# Patient Record
Sex: FEMALE | Race: ASIAN | URBAN - METROPOLITAN AREA
[De-identification: names, ages, dates, MRNs, and addresses within clinical notes are randomized per-mention and may not be internally consistent; named-entity substitution may affect disease eponyms.]

---

## 2024-01-05 ENCOUNTER — TELEPHONE (OUTPATIENT)
Dept: OBGYN CLINIC | Age: 34
End: 2024-01-05

## 2024-01-05 NOTE — TELEPHONE ENCOUNTER
Michelle Perry calling reporting a positive pregnancy test.    Michelle Perry is  a new patient.   If no: previous OBGYN no obgyn  This is  Michelle Perry's first pregnancy.        Michelle Perry last menstrual cycle: 11/18/23  Based on LMP patient is 6w6d weeks     Dose patient have any concerns?   []YES  [x]NO  If yes, please discuss concern with provider to determine if patient needs additional appointment.      Scheduling Instructions and Education  [x]If patient less than 8 weeks please schedule missed menses (30 mins) between 6-8 weeks. ALSO scheduled USN w/ IPV (w/ NURSE) to follow 2-3 weeks after missed menses   Patient education: Initial missed menses appointment will consist of a pregnancy test and to establish care and review previous births **if applicable**. There will NOT be an ultrasound at this first visit. Please review that the USN and IPV visit will be 2-3 weeks after patient's missed menses. At this appointment dating ultrasound will be complete, prenatal labs ordered, prenatal education completed, pelvic exam if indicated     Please document appointments scheduled during phone call   Missed menses date/provider: 1/16/24  USN/IPV date:1/24 1:45 usn, 1/24 2:15 ipv    Please forward telephone encounter to provider appointments are scheduled with prior to closing telephone encounter.

## 2024-01-10 ENCOUNTER — TELEPHONE (OUTPATIENT)
Dept: OBGYN CLINIC | Age: 34
End: 2024-01-10

## 2024-01-10 NOTE — TELEPHONE ENCOUNTER
Keon called earlier in regards to trying to get a letter stating that due to pregnancy the patient, his wife, was unable to get the MMR vaccine. They need this statement in order to get the process moving with getting the patient's green card. After speaking with Juliana I attempted to make contact with Keon. He did not answer the phone so I did leave a detailed message- let him know that unfortunately due to the fact that his wife is not an established patient here we are unable to give any documentation because she is not an established patient yet at our office. I did inform him that it was not recommended to get the MMR during pregnancy due to it being a live virus. Told him that if he had any questions to reach out/

## 2024-01-16 ENCOUNTER — HOSPITAL ENCOUNTER (OUTPATIENT)
Age: 34
Setting detail: SPECIMEN
Discharge: HOME | End: 2024-01-16

## 2024-01-16 ENCOUNTER — OFFICE VISIT (OUTPATIENT)
Dept: OBGYN CLINIC | Age: 34
End: 2024-01-16
Payer: COMMERCIAL

## 2024-01-16 VITALS
DIASTOLIC BLOOD PRESSURE: 70 MMHG | WEIGHT: 146 LBS | HEIGHT: 70 IN | SYSTOLIC BLOOD PRESSURE: 120 MMHG | BODY MASS INDEX: 20.9 KG/M2

## 2024-01-16 DIAGNOSIS — N91.2 AMENORRHEA: Primary | ICD-10-CM

## 2024-01-16 DIAGNOSIS — N92.6 MISSED MENSES: ICD-10-CM

## 2024-01-16 PROCEDURE — 99203 OFFICE O/P NEW LOW 30 MIN: CPT | Performed by: ADVANCED PRACTICE MIDWIFE

## 2024-01-16 PROCEDURE — 81025 URINE PREGNANCY TEST: CPT | Performed by: ADVANCED PRACTICE MIDWIFE

## 2024-01-16 RX ORDER — FOLIC ACID 1 MG/1
1 TABLET ORAL DAILY
COMMUNITY

## 2024-01-16 ASSESSMENT — ANXIETY QUESTIONNAIRES
GAD7 TOTAL SCORE: 3
2. NOT BEING ABLE TO STOP OR CONTROL WORRYING: 0
IF YOU CHECKED OFF ANY PROBLEMS ON THIS QUESTIONNAIRE, HOW DIFFICULT HAVE THESE PROBLEMS MADE IT FOR YOU TO DO YOUR WORK, TAKE CARE OF THINGS AT HOME, OR GET ALONG WITH OTHER PEOPLE: NOT DIFFICULT AT ALL
4. TROUBLE RELAXING: 0
6. BECOMING EASILY ANNOYED OR IRRITABLE: 1
7. FEELING AFRAID AS IF SOMETHING AWFUL MIGHT HAPPEN: 0
5. BEING SO RESTLESS THAT IT IS HARD TO SIT STILL: 0
1. FEELING NERVOUS, ANXIOUS, OR ON EDGE: 1
3. WORRYING TOO MUCH ABOUT DIFFERENT THINGS: 1

## 2024-01-16 NOTE — PROGRESS NOTES
[facility] [address]  [facility] [address]  Dept: [phone]    Patient Name: Michelle Perry  Patient Age: 33 y.o.  Date of Visit: 2024    SUBJECTIVE:    Chief Complaint:  Chief Complaint   Patient presents with    Amenorrhea       HPI:    Michelle  is being seen today for missed menses.  This  is a planned pregnancy.  She is  accepting at this time.  LMP: Patient's last menstrual period was 2023 (exact date). Sure and definite: Yes    28 day cycle: Yes    She was on a contraceptive at the time of conception.  Estimated weeks gestation today : 8w3d  Tentative JENNIFER: 24    Relationship with FOB: Keon    Patient reports concerns: no    OB History          1    Para        Term                AB        Living             SAB        IAB        Ectopic        Molar        Multiple        Live Births                  History reviewed. No pertinent past medical history.  Current Outpatient Medications   Medication Sig Dispense Refill    Prenatal Vit-DSS-Fe Cbn-FA (PRENATAL AD PO) Take by mouth      folic acid (FOLVITE) 1 MG tablet Take 1 tablet by mouth daily       No current facility-administered medications for this visit.         OBJECTIVE:    /70 (Site: Right Upper Arm, Position: Sitting, Cuff Size: Small Adult)   Ht 1.778 m (5' 10\")   Wt 66.2 kg (146 lb)   LMP 2023 (Exact Date)   BMI 20.95 kg/m²       She is complaining today of:   Pain: No  Cramping: No  Bleeding or spotting: No    Nausea: Yes  Vomiting: No    Breast enlargement/tenderness: No  Fatigue: Yes  Frequency of urination: No    Previous high risk obstetrical history: n/a  OB History    Para Term  AB Living   1             SAB IAB Ectopic Molar Multiple Live Births                    # Outcome Date GA Lbr Farzad/2nd Weight Sex Delivery Anes PTL Lv   1 Current                History was reviewed as documented on [facility]

## 2024-01-16 NOTE — PROGRESS NOTES
[facility] [address]  [facility] [address]  Dept: [phone]    Missed Menses Intake    Subjective:    Patient Name:Michelle Perry  Patient Age: 33 y.o.  Date of Visit: 2024    Michelle Perry arrives for missed menses visit.   Michelle Perry had a positive pregnancy test   Michelle Perry does not have concerns.   Planned Pregnancy: Yes  Partner/FOB name: Keon   Patient's last menstrual period was 2023 (exact date)., Regular menses: Yes  Estimate gestation age of LMP: 8w3d  Estimated due date based on LMP: 24  Patient Occupation: pt states she is working from home    OBGYN History:   Date of Last Pap Smear: unsure does not believe she has had this done    Number of Pregnancies: 1  Number of Live Births: 0  [] Vaginal Birth  []  Birth  [] Vaginal Birth after  delivery ()  [] History of High Risk Pregnancy(ies)  [] History of Birth Complications  [] Hx of infant with NICU stay    Past Medical History  Diabetes: No  Anxiety: No  Depression: No  Bipolar: No  High Blood Pressure:No  Stroke: No  Seizure: No  Deep Vein Thrombosis: No  Preeclampsia: No  Gestational Diabetes:No  Gestational Hypertension:No  Postpartum Hemorrhage: No  Bleeding Disorder: No  Sexually Transmitted Infections: No  Genital Herpes: No  History of chicken pox/varicella vaccine: No, unsure   Daily Medications: Yes  Pnv and folic acid    Past Family History (first degree relative)  Family history of blood clots: No  Family history of diabetes:No  Family history of factor V: No  Family history (mother/sister) of preeclampsia in a previous pregnancy: No    Early 1 Hour Glucose Screening  [] BMI 30 or greater (if marked, positive screen)  Risk Factors (need more than 1 if BMI less than 30)  [] Physical inactivity   [] First degree relative with diabetes  [] High- risk race or ethnicity (, , ,  American,

## 2024-01-17 DIAGNOSIS — N92.6 MISSED MENSES: ICD-10-CM

## 2024-01-17 DIAGNOSIS — N91.2 AMENORRHEA: ICD-10-CM

## 2024-01-17 LAB
AMPHET UR QL SCN: NEGATIVE
BARBITURATES UR QL SCN: NEGATIVE
BENZODIAZ UR QL: NEGATIVE
CANNABINOIDS UR QL SCN: NEGATIVE
CHLAMYDIA DNA UR QL NAA+PROBE: NEGATIVE
COCAINE UR QL SCN: NEGATIVE
FENTANYL UR QL: NEGATIVE
METHADONE UR QL: NEGATIVE
N GONORRHOEA DNA UR QL NAA+PROBE: NEGATIVE
OPIATES UR QL SCN: NEGATIVE
OXYCODONE UR QL SCN: NEGATIVE
PCP UR QL SCN: NEGATIVE
SPECIMEN DESCRIPTION: NORMAL
TEST INFORMATION: NORMAL

## 2024-01-23 NOTE — PROGRESS NOTES
DISCHARGE SUMMARY  Time: 30+ minutes of discharge activities.    Date of admission: 4/5/2018  Date of discharge: 4/8/2018    Principal Diagnosis:  Mood disorder not otherwise specified    Other Diagnosis/Problem List:   Borderline personality features  Anxiety disorder not otherwise specified  Cannabis use disorder  Active Hospital Problems    Diagnosis Date Noted   • Irregular menses        Reason for Admission :  Please see intake psychiatric evaluation for details.     Hospital Course:Liliya Corrigan was admitted and restricted to the unit. The key issues addressed during this stay were  suicidal ideation, anxiety disorder symptoms, out of control behavior, depression symptoms, substance use disorder symptoms, psychosocial stressors and mood symptoms. Patient was treated with scheduled psychotropic medications including lamotrigine, venlafaxine. Patient was also prescribed p.r.n. medications. Patient received milieu, individual and group psychotherapy.   Patient 's condition improved . Patient displayed no self-injurious behaviors. Patient displayed no physically aggressive behaviors.  Patient was  compliant with prescribed medication administration. Patient had no adverse treatment effects..Detox: no. Patient was  stable for discharge on 4/8/2018. Lab work was drawn with results shown below.     Condition of Patient on 4/8/2018): Writer reviewed chart, discussed case with team, performed discharge items including medication reconciliation, reviewed/arranged aftercare in consultation/coordination with other staff.     I interviewed Liliya Corrigan.  Patient's symptoms were substantially improved.     Reports feeling a lot better. Pleasant on contact today. Asking to leave today. States her aunt is sick admitted at [facility]. States she can stay with a friend. No suicidal thoughts anymore. We discussed follow-up. Renew counseling and    New Obstetric Intake     Patient Name:Michelle Perry  Patient Age: 33 y.o.  Date of Visit: 2024  Patient's estimated gestational age at visit: 9W4D  Estimated Date of Delivery: 24-TWINS    Subjective:    Any Concerns Today: yes - twins    OB History          1    Para        Term                AB        Living             SAB        IAB        Ectopic        Molar        Multiple        Live Births                    Lawtons Score:   Postpartum Depression: Not on file      History of postpartum depression: N/A    Generalized Anxiety Screenin/24/2024     2:52 PM 2024     3:42 PM   NEVIN 7 SCORE   NEVIN-7 Total Score 8 3     Interpretation of NEVIN-7 score: 5-9 = mild anxiety, 10-14 = moderate anxiety, 15+ = severe anxiety. Recommend referral to behavioral health for scores 10 or greater.     Social Determinants of Health:   Financial Resource Strain: Low Risk  (2024)    Overall Financial Resource Strain (CARDIA)     Difficulty of Paying Living Expenses: Not hard at all      Food Insecurity: No Food Insecurity (2024)    Hunger Vital Sign     Worried About Running Out of Food in the Last Year: Never true     Ran Out of Food in the Last Year: Never true      Transportation Needs: No Transportation Needs (2024)    PRAPARE - Transportation     Lack of Transportation (Medical): No     Lack of Transportation (Non-Medical): No      Intimate Partner Violence: Not At Risk (2024)    Humiliation, Afraid, Rape, and Kick questionnaire     Fear of Current or Ex-Partner: No     Emotionally Abused: No     Physically Abused: No     Sexually Abused: No       Objective:      /62   Pulse 100   Wt 65.8 kg (145 lb)   LMP 2023 (Exact Date)      Medications:  Current Outpatient Medications   Medication Sig Dispense Refill    ferrous sulfate (FE TABS 325) 325 (65 Fe) MG EC tablet Take 1 tablet by mouth 3 times daily (with meals)      Prenatal Vit-DSS-Fe Cbn-FA (PRENATAL AD  mental health [facility] hospital program. We discussed her marijuana use and effect of it on her mental health. Agrees to stay sober.    We reviewed hospital course, safety and recovery plans, and aftercare treatment needs as well as medications, side effects and monitoring and risk issues. Patient was advised/encouraged to follow-up with all aftercare. Advised to refrain from use of alcohol and illicit drugs. Advised to seek help in psychiatric emergency.    VITALS:    Visit Vitals  /81 (Patient Position: Standing)   Pulse 85   Temp 98 °F (36.7 °C) (Oral)   Resp 16   Ht 4' 11\" (1.499 m)   Wt 75 kg   LMP 03/09/2018   SpO2 99%   BMI 33.40 kg/m²       MENTAL STATUS EXAM    General appearance: well-nourished  Grooming: appropriate  Attitude: Cooperative and Pleasant  Speech: normal rate, rhythm, volume and tone  Mood/affect: euthymic  Psychomotor: normal  Associations: intact  Thought process: intact  Thought content: unremarkable  Perceptual disorders/hallucinations: none  Level of consciousness: alert  Orientation: oriented to person, place, time, and general circumstances  Attention/Concentration: ability to maintain attention  Fund of knowledge/Intelligence: average  Memory: no apparent impairments in short term memory and no apparent impairments in long term memory   Insight: fair  Judgment: fair  Suicidal thoughts: no  Homicidal thoughts: no  Language:intact  Gait/Station:normal    Medical History and Physical examination:  Seen by consultant. No significant findings/abnormalities. Please see the admission H & P consultation note.        Discharge Medications:      Summary of your Discharge Medications      Take these Medications      Details   * albuterol 0.63 MG/3ML nebulizer solution  Commonly known as:  ACCUNEB   Take 0.63 mg by nebulization every 6 hours as needed for Wheezing or Shortness of Breath.     * albuterol 108 (90 Base) MCG/ACT inhaler   Inhale 2 puffs into the lungs every 4 hours as needed for  Shortness of Breath or Wheezing.     azelastine 0.1 % nasal spray  Commonly known as:  ASTELIN   Spray 1 spray in each nostril 2 times daily. Use in each nostril as directed     DISPENSE   Cortisone Injection Inject into the left foot once.     DULERA 100-5 MCG/ACT inhaler   Generic drug:  mometasone-formoterol  Inhale 1 puff into the lungs 2 times daily.     fluticasone 50 MCG/ACT nasal spray  Commonly known as:  FLONASE   Spray 2 sprays in each nostril daily.     hydrOXYzine 25 MG tablet  Commonly known as:  ATARAX   Take 1 tablet by mouth every 6 hours as needed for Anxiety.     lamoTRIgine 150 MG tablet  Commonly known as:  LaMICtal   Take 1 tablet by mouth daily.     montelukast 10 MG tablet  Commonly known as:  SINGULAIR   Take 10 mg by mouth nightly.     omeprazole 20 MG capsule  Commonly known as:  PRILOSEC   Take 20 mg by mouth daily.     traZODone 100 MG tablet  Commonly known as:  DESYREL   Take 2 tablets by mouth nightly.     Valacyclovir HCl 1000 MG Tab   Take 1,000 mg by mouth daily.     * venlafaxine  MG 24 hr capsule  Commonly known as:  EFFEXOR XR   Take 1 capsule by mouth daily.     * venlafaxine XR 75 MG 24 hr capsule  Commonly known as:  EFFEXOR XR   Take 1 capsule by mouth daily. Take in addition to 150mg capsule (total dose 225mg daily)  Comment:  Patient would like to  asap, thank you        * This list has 4 medication(s) that are the same as other medications prescribed for you. Read the directions carefully, and ask your doctor or other care provider to review them with you.                 Medication Indications      Medication      Indication   albuterol 0.63 MG/3ML nebulizer solution  Commonly known as:  ACCUNEB      albuterol 108 (90 Base) MCG/ACT inhaler      azelastine 0.1 % nasal spray  Commonly known as:  ASTELIN      DISPENSE      DULERA 100-5 MCG/ACT inhaler  Generic drug:  mometasone-formoterol      fluticasone 50 MCG/ACT nasal spray  Commonly known as:  FLONASE       hydrOXYzine 25 MG tablet  Commonly known as:  ATARAX  Take 1 tablet by mouth every 6 hours as needed for Anxiety.      lamoTRIgine 150 MG tablet  Commonly known as:  LaMICtal  Take 1 tablet by mouth daily.   bipolar disorder     montelukast 10 MG tablet  Commonly known as:  SINGULAIR      omeprazole 20 MG capsule  Commonly known as:  PRILOSEC      traZODone 100 MG tablet  Commonly known as:  DESYREL  Take 2 tablets by mouth nightly.      Valacyclovir HCl 1000 MG Tab      venlafaxine  MG 24 hr capsule  Commonly known as:  EFFEXOR XR  Take 1 capsule by mouth daily.      venlafaxine XR 75 MG 24 hr capsule  Commonly known as:  EFFEXOR XR  Take 1 capsule by mouth daily. Take in addition to 150mg capsule (total dose 225mg daily)   depression, anxiety, PTSD            Risk Status on discharge: Not an acute/imminent risk of harm to self or others.    Tobacco Best Practice  Nicotine replacement will not be ordered due to: Patient refuses nicotine gum and nicotine patch for tobacco cessation      If the patient is on two or more scheduled antipsychotic agents:  No     Legal:Voluntary      After Care :  follow up with out pt psychiatrist and therapist and [facility]  Please see after visit summary for details     Laboratory Tests:    CBC with differential  Lab Results   Component Value Date    WBC 14.0 (H) 03/10/2018    RBC 4.86 03/10/2018    HGB 14.2 03/10/2018    HCT 42.1 03/10/2018     03/10/2018     06/25/2011    SEG 54 03/10/2018    PMON 5 03/10/2018    PEOS 3 03/10/2018    PBASO 1 03/10/2018    ANEUT 7.6 03/10/2018    ALYMS 5.1 (H) 03/10/2018    JESSY 0.8 03/10/2018    AEOS 0.4 03/10/2018    ABASO 0.1 03/10/2018       Comprehensive metabolic panel  Lab Results   Component Value Date    SODIUM 140 03/10/2018    POTASSIUM 4.3 03/10/2018    CHLORIDE 106 03/10/2018    CO2 26 03/10/2018    GLUCOSE 111 (H) 03/10/2018    BUN 11 03/10/2018    CREATININE 0.55 03/10/2018    CALCIUM 9.5  03/10/2018    ALBUMIN 3.8 06/25/2011    BILIRUBIN 0.3 06/25/2011    ALKPT 81 06/25/2011    AST 14 06/25/2011    GPT 35 06/25/2011     No results found for: MG  TSH (mcUnits/mL)   Date Value   03/10/2018 1.506     No results found for this or any previous visit.    POC Breath Alcohol testing result:       Hepatitis serology result:  @[facility][02878.03]@    POC Urine Drug Screen Results  Cocaine:                   Negative (04/05/18 1230)  Opiates:                    Negative (04/05/18 1230)  Buprenorphine:          Negative (04/05/18 1230)  Amphetamines:         Negative (04/05/18 1230)  Propoxyphene:            Oxycodone/Oxycontin:   Negative (04/05/18 1230)  Methamphetamine:     Negative (04/05/18 1230)  Barbiturates:                Negative (04/05/18 1230)  Marijuana:              Positive (04/05/18 1230)  Benzodiazepine:      Negative (04/05/18 1230)  Methadone:                    Negative (04/05/18 1230)  Methylenedioxymethamphetamine:  Negative (04/05/18 1230)      No results found for: LITHIUM   No results found for: VALP  No results found for: CARBAM    No results found for: CPK  No results found for: HGBA1C    LIPID PANEL  No results found for: CHOLESTEROL No results found for: HDL No results found for: CHOHDL No results found for: TRIGLYCERIDE No results found for: CALCLDL  No results found for this or any previous visit.    Urine Panel  No results for input(s): UOSM, UK, 5UNITR, UCROA, UCL, NORMA, UKET, USPG, UPROT, UWBC, URBC, UBILI, UPH, UURO, USPG, UBACTR, UTPELC in the last 72 hours.    Invalid input(s): SPGRAVITY    Latest radiology results: No results found.    EKG- none

## 2024-01-24 ENCOUNTER — HOSPITAL ENCOUNTER (OUTPATIENT)
Age: 34
Setting detail: SPECIMEN
Discharge: HOME | End: 2024-01-24

## 2024-01-24 ENCOUNTER — INITIAL PRENATAL (OUTPATIENT)
Dept: OBGYN CLINIC | Age: 34
End: 2024-01-24

## 2024-01-24 VITALS
DIASTOLIC BLOOD PRESSURE: 62 MMHG | WEIGHT: 145 LBS | SYSTOLIC BLOOD PRESSURE: 102 MMHG | BODY MASS INDEX: 20.81 KG/M2 | HEART RATE: 100 BPM

## 2024-01-24 DIAGNOSIS — Z3A.09 9 WEEKS GESTATION OF PREGNANCY: Primary | ICD-10-CM

## 2024-01-24 DIAGNOSIS — Z3A.09 9 WEEKS GESTATION OF PREGNANCY: ICD-10-CM

## 2024-01-24 PROCEDURE — 0500F INITIAL PRENATAL CARE VISIT: CPT | Performed by: ADVANCED PRACTICE MIDWIFE

## 2024-01-24 RX ORDER — LANOLIN ALCOHOL/MO/W.PET/CERES
325 CREAM (GRAM) TOPICAL
COMMUNITY

## 2024-01-24 SDOH — ECONOMIC STABILITY: INCOME INSECURITY: IN THE LAST 12 MONTHS, WAS THERE A TIME WHEN YOU WERE NOT ABLE TO PAY THE MORTGAGE OR RENT ON TIME?: NO

## 2024-01-24 SDOH — ECONOMIC STABILITY: TRANSPORTATION INSECURITY
IN THE PAST 12 MONTHS, HAS THE LACK OF TRANSPORTATION KEPT YOU FROM MEDICAL APPOINTMENTS OR FROM GETTING MEDICATIONS?: NO

## 2024-01-24 SDOH — ECONOMIC STABILITY: HOUSING INSECURITY: IN THE LAST 12 MONTHS, HOW MANY PLACES HAVE YOU LIVED?: 2

## 2024-01-24 SDOH — ECONOMIC STABILITY: FOOD INSECURITY: WITHIN THE PAST 12 MONTHS, YOU WORRIED THAT YOUR FOOD WOULD RUN OUT BEFORE YOU GOT MONEY TO BUY MORE.: NEVER TRUE

## 2024-01-24 SDOH — ECONOMIC STABILITY: HOUSING INSECURITY
IN THE LAST 12 MONTHS, WAS THERE A TIME WHEN YOU DID NOT HAVE A STEADY PLACE TO SLEEP OR SLEPT IN A SHELTER (INCLUDING NOW)?: NO

## 2024-01-24 SDOH — ECONOMIC STABILITY: TRANSPORTATION INSECURITY
IN THE PAST 12 MONTHS, HAS LACK OF TRANSPORTATION KEPT YOU FROM MEETINGS, WORK, OR FROM GETTING THINGS NEEDED FOR DAILY LIVING?: NO

## 2024-01-24 SDOH — ECONOMIC STABILITY: FOOD INSECURITY: WITHIN THE PAST 12 MONTHS, THE FOOD YOU BOUGHT JUST DIDN'T LAST AND YOU DIDN'T HAVE MONEY TO GET MORE.: NEVER TRUE

## 2024-01-24 ASSESSMENT — ANXIETY QUESTIONNAIRES
6. BECOMING EASILY ANNOYED OR IRRITABLE: 2
2. NOT BEING ABLE TO STOP OR CONTROL WORRYING: 1
4. TROUBLE RELAXING: 1
7. FEELING AFRAID AS IF SOMETHING AWFUL MIGHT HAPPEN: 1
1. FEELING NERVOUS, ANXIOUS, OR ON EDGE: 1
IF YOU CHECKED OFF ANY PROBLEMS ON THIS QUESTIONNAIRE, HOW DIFFICULT HAVE THESE PROBLEMS MADE IT FOR YOU TO DO YOUR WORK, TAKE CARE OF THINGS AT HOME, OR GET ALONG WITH OTHER PEOPLE: SOMEWHAT DIFFICULT
5. BEING SO RESTLESS THAT IT IS HARD TO SIT STILL: 1
GAD7 TOTAL SCORE: 8
3. WORRYING TOO MUCH ABOUT DIFFERENT THINGS: 1

## 2024-01-24 ASSESSMENT — SOCIAL DETERMINANTS OF HEALTH (SDOH)
HOW HARD IS IT FOR YOU TO PAY FOR THE VERY BASICS LIKE FOOD, HOUSING, MEDICAL CARE, AND HEATING?: NOT HARD AT ALL
WITHIN THE LAST YEAR, HAVE TO BEEN RAPED OR FORCED TO HAVE ANY KIND OF SEXUAL ACTIVITY BY YOUR PARTNER OR EX-PARTNER?: NO
WITHIN THE LAST YEAR, HAVE YOU BEEN HUMILIATED OR EMOTIONALLY ABUSED IN OTHER WAYS BY YOUR PARTNER OR EX-PARTNER?: NO
WITHIN THE LAST YEAR, HAVE YOU BEEN AFRAID OF YOUR PARTNER OR EX-PARTNER?: NO
WITHIN THE LAST YEAR, HAVE YOU BEEN KICKED, HIT, SLAPPED, OR OTHERWISE PHYSICALLY HURT BY YOUR PARTNER OR EX-PARTNER?: NO

## 2024-01-25 LAB
MICROORGANISM SPEC CULT: NO GROWTH
SPECIMEN DESCRIPTION: NORMAL

## 2024-02-12 ENCOUNTER — HOSPITAL ENCOUNTER (OUTPATIENT)
Age: 34
Discharge: HOME | End: 2024-02-12
Payer: COMMERCIAL

## 2024-02-12 ENCOUNTER — ROUTINE PRENATAL (OUTPATIENT)
Dept: PERINATAL CARE | Age: 34
End: 2024-02-12
Payer: COMMERCIAL

## 2024-02-12 VITALS
BODY MASS INDEX: 20.9 KG/M2 | SYSTOLIC BLOOD PRESSURE: 121 MMHG | HEIGHT: 70 IN | HEART RATE: 100 BPM | WEIGHT: 146 LBS | RESPIRATION RATE: 16 BRPM | TEMPERATURE: 97.5 F | DIASTOLIC BLOOD PRESSURE: 76 MMHG

## 2024-02-12 DIAGNOSIS — Z3A.12 12 WEEKS GESTATION OF PREGNANCY: Primary | ICD-10-CM

## 2024-02-12 DIAGNOSIS — O34.10 LEIOMYOMA IN PREGNANCY, UTERINE, ANTEPARTUM: ICD-10-CM

## 2024-02-12 DIAGNOSIS — O09.511 PRIMIGRAVIDA OF ADVANCED MATERNAL AGE IN FIRST TRIMESTER: ICD-10-CM

## 2024-02-12 DIAGNOSIS — Z3A.12 12 WEEKS GESTATION OF PREGNANCY: ICD-10-CM

## 2024-02-12 DIAGNOSIS — O30.041 DICHORIONIC DIAMNIOTIC TWIN PREGNANCY IN FIRST TRIMESTER: ICD-10-CM

## 2024-02-12 DIAGNOSIS — Z36.9 FIRST TRIMESTER SCREENING: Primary | ICD-10-CM

## 2024-02-12 DIAGNOSIS — O28.3 INCREASED NUCHAL TRANSLUCENCY SPACE ON FETAL ULTRASOUND: ICD-10-CM

## 2024-02-12 DIAGNOSIS — D25.9 LEIOMYOMA IN PREGNANCY, UTERINE, ANTEPARTUM: ICD-10-CM

## 2024-02-12 DIAGNOSIS — O35.9XX2 FETAL ABNORMALITY AFFECTING MANAGEMENT OF MOTHER, FETUS 2 OF MULTIPLE GESTATION: ICD-10-CM

## 2024-02-12 PROCEDURE — 76802 OB US < 14 WKS ADDL FETUS: CPT | Performed by: OBSTETRICS & GYNECOLOGY

## 2024-02-12 PROCEDURE — 76813 OB US NUCHAL MEAS 1 GEST: CPT | Performed by: OBSTETRICS & GYNECOLOGY

## 2024-02-12 PROCEDURE — 76801 OB US < 14 WKS SINGLE FETUS: CPT | Performed by: OBSTETRICS & GYNECOLOGY

## 2024-02-12 PROCEDURE — 99999 PR OFFICE/OUTPT VISIT,PROCEDURE ONLY: CPT | Performed by: OBSTETRICS & GYNECOLOGY

## 2024-02-12 PROCEDURE — 36415 COLL VENOUS BLD VENIPUNCTURE: CPT

## 2024-02-12 PROCEDURE — 76814 OB US NUCHAL MEAS ADD-ON: CPT | Performed by: OBSTETRICS & GYNECOLOGY

## 2024-02-12 PROCEDURE — 81508 FTL CGEN ABNOR TWO PROTEINS: CPT

## 2024-02-15 ENCOUNTER — HOSPITAL ENCOUNTER (OUTPATIENT)
Age: 34
Discharge: HOME | End: 2024-02-15
Payer: COMMERCIAL

## 2024-02-15 ENCOUNTER — ROUTINE PRENATAL (OUTPATIENT)
Dept: PERINATAL CARE | Age: 34
End: 2024-02-15

## 2024-02-15 VITALS
HEART RATE: 76 BPM | HEIGHT: 70 IN | TEMPERATURE: 99.1 F | DIASTOLIC BLOOD PRESSURE: 74 MMHG | WEIGHT: 152 LBS | BODY MASS INDEX: 21.76 KG/M2 | RESPIRATION RATE: 16 BRPM | SYSTOLIC BLOOD PRESSURE: 124 MMHG

## 2024-02-15 DIAGNOSIS — Z3A.12 12 WEEKS GESTATION OF PREGNANCY: ICD-10-CM

## 2024-02-15 DIAGNOSIS — O30.041 DICHORIONIC DIAMNIOTIC TWIN PREGNANCY IN FIRST TRIMESTER: Primary | ICD-10-CM

## 2024-02-15 DIAGNOSIS — O09.511 PRIMIGRAVIDA OF ADVANCED MATERNAL AGE IN FIRST TRIMESTER: ICD-10-CM

## 2024-02-15 DIAGNOSIS — O35.9XX2 FETAL ABNORMALITY AFFECTING MANAGEMENT OF MOTHER, FETUS 2 OF MULTIPLE GESTATION: ICD-10-CM

## 2024-02-15 DIAGNOSIS — O34.10 LEIOMYOMA IN PREGNANCY, UTERINE, ANTEPARTUM: ICD-10-CM

## 2024-02-15 DIAGNOSIS — O28.3 INCREASED NUCHAL TRANSLUCENCY SPACE ON FETAL ULTRASOUND: ICD-10-CM

## 2024-02-15 DIAGNOSIS — D25.9 LEIOMYOMA IN PREGNANCY, UTERINE, ANTEPARTUM: ICD-10-CM

## 2024-02-15 LAB
AFP INTERPRETATION: ABNORMAL
AFP SPECIMEN: ABNORMAL
CMV IGG SERPL QL IA: 78.3
CMV IGM SERPL QL IA: 0.2
CRL: 54.3 MM
CROWN RUMP LENGTH TWIN B: 57.7 MM
CROWN RUMP LENGTH: ABNORMAL
DATE OF BIRTH: ABNORMAL
DONOR EGG?: ABNORMAL
GESTATIONAL AGE: ABNORMAL
HX OF HEREDITARY DISORDERS: ABNORMAL
IN VITRO FERTILIZATION: ABNORMAL
MATERNAL AGE AT EDD: 35.5 YR
MATERNAL SCREEN, EER: ABNORMAL
MATERNAL WEIGHT: 146
MOM FOR NT, TWIN B: 2.77
MOM FOR NT: 0.8
MOM FOR PAPP-A: 2.8
MONOCHORIONIC TWINS: ABNORMAL
MSHCG-MOM: 3.05
MSHCG: ABNORMAL IU/L
NUCHAL TRANSLUC (NT): 1.1 MM
NUCHAL TRANSLUC (NT): ABNORMAL
NUCHAL TRANSLUCENCY TWIN B: 4 MM
NUMBER OF FETUSES: 2
NUMBER OF FETUSES: ABNORMAL
PAPP-A MOM: 2376.1 NG/ML
PATIENT WEIGHT UNITS: ABNORMAL
PATIENT WEIGHT: ABNORMAL
PREV TRISOMY PREG: ABNORMAL
RACE (MATERNAL): ABNORMAL
RACE: ABNORMAL
READING MD CERT NUM: ABNORMAL
READING MD NAME: ABNORMAL
REPEAT SPECIMEN?: ABNORMAL
SMOKING: ABNORMAL
SMOKING: ABNORMAL
SONOGRAPHER CERT NO: ABNORMAL
SONOGRAPHER CERT NO: ABNORMAL
SONOGRAPHER NAME: ABNORMAL
SONOGRAPHER NAME: ABNORMAL
ULTRASOUND DATE: ABNORMAL
ULTRASOUND DATE: ABNORMAL

## 2024-02-15 PROCEDURE — 86777 TOXOPLASMA ANTIBODY: CPT

## 2024-02-15 PROCEDURE — 86658 ENTEROVIRUS ANTIBODY: CPT

## 2024-02-15 PROCEDURE — 86645 CMV ANTIBODY IGM: CPT

## 2024-02-15 PROCEDURE — 86747 PARVOVIRUS ANTIBODY: CPT

## 2024-02-15 PROCEDURE — 86644 CMV ANTIBODY: CPT

## 2024-02-15 PROCEDURE — 86778 TOXOPLASMA ANTIBODY IGM: CPT

## 2024-02-15 PROCEDURE — 36415 COLL VENOUS BLD VENIPUNCTURE: CPT

## 2024-02-16 LAB
T GONDII IGG SER-ACNC: <0.5 IU/ML
T GONDII IGM SER-ACNC: 0.25 INDEX

## 2024-02-18 LAB
CV A9 AB TITR SER CF: NORMAL {TITER}
PARVOVIRUS B19 IGG ANTIBODY: 4.4 IV
PARVOVIRUS B19 IGM ANTIBODY: 0.14 IV

## 2024-02-24 LAB
COXSACKIE B1 ANTIBODY: ABNORMAL
COXSACKIE B2 ANTIBODY: ABNORMAL
COXSACKIE B3 ANTIBODY: ABNORMAL
COXSACKIE B4 ANTIBODY: ABNORMAL
COXSACKIE B5 ANTIBODY: ABNORMAL
COXSACKIE B6 ANTIBODY: ABNORMAL

## 2024-03-04 LAB
AFP INTERPRETATION: ABNORMAL
AFP SPECIMEN: ABNORMAL
CRL: 54.3 MM
CROWN RUMP LENGTH TWIN B: 57.7 MM
CROWN RUMP LENGTH: ABNORMAL
DATE OF BIRTH: ABNORMAL
DONOR EGG?: ABNORMAL
GESTATIONAL AGE: ABNORMAL
HX OF HEREDITARY DISORDERS: ABNORMAL
IN VITRO FERTILIZATION: ABNORMAL
MATERNAL AGE AT EDD: 35.5 YR
MATERNAL SCREEN, EER: ABNORMAL
MATERNAL WEIGHT: 146
MOM FOR NT, TWIN B: 2.77
MOM FOR NT: 0.8
MOM FOR PAPP-A: 2.8
MONOCHORIONIC TWINS: ABNORMAL
MSHCG-MOM: 3.05
MSHCG: ABNORMAL IU/L
NUCHAL TRANSLUC (NT): 1.1 MM
NUCHAL TRANSLUC (NT): ABNORMAL
NUCHAL TRANSLUCENCY TWIN B: 4 MM
NUMBER OF FETUSES: 2
NUMBER OF FETUSES: ABNORMAL
PAPP-A MOM: 2376.1 NG/ML
PATIENT WEIGHT UNITS: ABNORMAL
PATIENT WEIGHT: ABNORMAL
PREV TRISOMY PREG: ABNORMAL
RACE (MATERNAL): ABNORMAL
RACE: ABNORMAL
READING MD CERT NUM: ABNORMAL
READING MD NAME: ABNORMAL
REPEAT SPECIMEN?: ABNORMAL
SMOKING: ABNORMAL
SMOKING: ABNORMAL
SONOGRAPHER CERT NO: ABNORMAL
SONOGRAPHER CERT NO: ABNORMAL
SONOGRAPHER NAME: ABNORMAL
SONOGRAPHER NAME: ABNORMAL
ULTRASOUND DATE: ABNORMAL
ULTRASOUND DATE: ABNORMAL

## 2024-03-06 ENCOUNTER — TELEPHONE (OUTPATIENT)
Dept: PERINATAL CARE | Age: 34
End: 2024-03-06

## 2024-03-06 NOTE — TELEPHONE ENCOUNTER
Phone call made to Salvador regarding lab results done on 02/12/2024 FTS results were discussed with Salvador and he will speak with Analisa and if interested in a consult to discuss they will call the office and schedule a M consult with Dr. Rios.

## 2024-03-13 ENCOUNTER — ROUTINE PRENATAL (OUTPATIENT)
Dept: PERINATAL CARE | Age: 34
End: 2024-03-13
Payer: COMMERCIAL

## 2024-03-13 VITALS
SYSTOLIC BLOOD PRESSURE: 123 MMHG | WEIGHT: 160.5 LBS | BODY MASS INDEX: 22.98 KG/M2 | HEIGHT: 70 IN | HEART RATE: 102 BPM | DIASTOLIC BLOOD PRESSURE: 69 MMHG | RESPIRATION RATE: 16 BRPM | TEMPERATURE: 99.1 F

## 2024-03-13 DIAGNOSIS — Z13.89 ENCOUNTER FOR ROUTINE SCREENING FOR MALFORMATION USING ULTRASONICS: ICD-10-CM

## 2024-03-13 DIAGNOSIS — O09.512 PRIMIGRAVIDA OF ADVANCED MATERNAL AGE IN SECOND TRIMESTER: ICD-10-CM

## 2024-03-13 DIAGNOSIS — O35.9XX2 FETAL ABNORMALITY AFFECTING MANAGEMENT OF MOTHER, FETUS 2 OF MULTIPLE GESTATION: ICD-10-CM

## 2024-03-13 DIAGNOSIS — O34.10 LEIOMYOMA IN PREGNANCY, UTERINE, ANTEPARTUM: ICD-10-CM

## 2024-03-13 DIAGNOSIS — Z3A.16 16 WEEKS GESTATION OF PREGNANCY: ICD-10-CM

## 2024-03-13 DIAGNOSIS — D25.9 LEIOMYOMA IN PREGNANCY, UTERINE, ANTEPARTUM: ICD-10-CM

## 2024-03-13 DIAGNOSIS — O30.042 DICHORIONIC DIAMNIOTIC TWIN PREGNANCY IN SECOND TRIMESTER: Primary | ICD-10-CM

## 2024-03-13 PROBLEM — O28.9 ABNORMAL FIRST TRIMESTER SCREEN: Status: ACTIVE | Noted: 2024-03-13

## 2024-03-13 PROCEDURE — 76810 OB US >/= 14 WKS ADDL FETUS: CPT | Performed by: OBSTETRICS & GYNECOLOGY

## 2024-03-13 PROCEDURE — 76817 TRANSVAGINAL US OBSTETRIC: CPT | Performed by: OBSTETRICS & GYNECOLOGY

## 2024-03-13 PROCEDURE — 99999 PR OFFICE/OUTPT VISIT,PROCEDURE ONLY: CPT | Performed by: OBSTETRICS & GYNECOLOGY

## 2024-03-13 PROCEDURE — 76805 OB US >/= 14 WKS SNGL FETUS: CPT | Performed by: OBSTETRICS & GYNECOLOGY

## 2024-03-13 RX ORDER — ACETAMINOPHEN 500 MG
500 TABLET ORAL EVERY 6 HOURS PRN
COMMUNITY
Start: 2024-02-23

## 2024-03-13 NOTE — PROGRESS NOTES
I would advise continuation of daily oral baby aspirin 81 mg based on guidelines by the USPSTF/ACOG (for preeclampsia prevention for pregnant women at \"high-risk\"  for preeclampsia).        Patient declined genetic amniocentesis/invasive diagnostic testing of both twin sacs today.  Patient declined invasive prenatal diagnostic testing (including for evaluation and testing for fetal aneuploidy, fetal microdeletions, fetal single gene disorders, fetal microarray testing, etc).     Please refer to non-invasive prenatal testing/NIPT results (via [facility]).     Please refer to maternal carrier results from [facility] carrier screening testing.     Please refer to recent laboratory results for  infections.      Patient declines a Maternal-Fetal Medicine follow-up physician consultation today regarding the fetal and/or maternal medical/obstetrical complications/co-morbidities of pregnancy (specifically for abnormal first trimester screening lab results).      Maternal-Fetal Medicine (MFM) attending physician will defer all management for these medical/obstetrical complications of pregnancy to the primary attending obstetrical physician/provider, as a result.  Therefore, only an ultrasound evaluation was completed today in the MFM office.      Please refer to Maternal-Fetal Medicine OBGYN resident progress note in EPIC.

## 2024-03-13 NOTE — PROGRESS NOTES
Obstetric/Gynecology Maternal Fetal Medicine Resident Note    Patient declines formal consult with MFM attending physician for abnormal first trimester screening results.     Aleisha Robbins MD  OBGYN Resident, PGY1  [facility]  3/13/2024, 2:18 PM

## 2024-03-28 ENCOUNTER — HOSPITAL ENCOUNTER (OUTPATIENT)
Age: 34
Setting detail: SPECIMEN
Discharge: HOME | End: 2024-03-28

## 2024-03-28 ENCOUNTER — ROUTINE PRENATAL (OUTPATIENT)
Dept: OBGYN CLINIC | Age: 34
End: 2024-03-28

## 2024-03-28 VITALS — SYSTOLIC BLOOD PRESSURE: 124 MMHG | BODY MASS INDEX: 23.62 KG/M2 | WEIGHT: 164.6 LBS | DIASTOLIC BLOOD PRESSURE: 68 MMHG

## 2024-03-28 DIAGNOSIS — Z3A.09 9 WEEKS GESTATION OF PREGNANCY: ICD-10-CM

## 2024-03-28 DIAGNOSIS — O30.042 DICHORIONIC DIAMNIOTIC TWIN PREGNANCY IN SECOND TRIMESTER: ICD-10-CM

## 2024-03-28 DIAGNOSIS — O09.92 HIGH-RISK PREGNANCY IN SECOND TRIMESTER: ICD-10-CM

## 2024-03-28 DIAGNOSIS — Z3A.18 18 WEEKS GESTATION OF PREGNANCY: ICD-10-CM

## 2024-03-28 DIAGNOSIS — O28.3 INCREASED NUCHAL TRANSLUCENCY SPACE ON FETAL ULTRASOUND: ICD-10-CM

## 2024-03-28 DIAGNOSIS — O09.92 HIGH-RISK PREGNANCY IN SECOND TRIMESTER: Primary | ICD-10-CM

## 2024-03-28 DIAGNOSIS — O35.9XX2 FETAL ABNORMALITY AFFECTING MANAGEMENT OF MOTHER, FETUS 2 OF MULTIPLE GESTATION: ICD-10-CM

## 2024-03-28 DIAGNOSIS — O44.42 LOW-LYING PLACENTA IN SECOND TRIMESTER: ICD-10-CM

## 2024-03-28 PROBLEM — O35.9XX0 FETAL ABNORMALITY AFFECTING MANAGEMENT OF MOTHER: Status: ACTIVE | Noted: 2024-03-28

## 2024-03-28 LAB
ABO + RH BLD: NORMAL
BACTERIA URNS QL MICRO: NORMAL
BASOPHILS # BLD: 0.04 K/UL (ref 0–0.2)
BASOPHILS NFR BLD: 0 % (ref 0–2)
BILIRUB UR QL STRIP: NEGATIVE
BLOOD GROUP ANTIBODIES SERPL: NEGATIVE
CASTS #/AREA URNS LPF: NORMAL /LPF (ref 0–8)
CLARITY UR: CLEAR
COLOR UR: YELLOW
EOSINOPHIL # BLD: 0.04 K/UL (ref 0–0.44)
EOSINOPHILS RELATIVE PERCENT: 0 % (ref 1–4)
EPI CELLS #/AREA URNS HPF: NORMAL /HPF (ref 0–5)
ERYTHROCYTE [DISTWIDTH] IN BLOOD BY AUTOMATED COUNT: 14.3 % (ref 11.8–14.4)
GLUCOSE UR STRIP-MCNC: ABNORMAL MG/DL
HBV SURFACE AG SERPL QL IA: NONREACTIVE
HCT VFR BLD AUTO: 36.1 % (ref 36.3–47.1)
HCV AB SERPL QL IA: NONREACTIVE
HGB BLD-MCNC: 11.7 G/DL (ref 11.9–15.1)
HGB UR QL STRIP.AUTO: NEGATIVE
HIV 1+2 AB+HIV1 P24 AG SERPL QL IA: NONREACTIVE
IMM GRANULOCYTES # BLD AUTO: 0.16 K/UL (ref 0–0.3)
IMM GRANULOCYTES NFR BLD: 1 %
KETONES UR STRIP-MCNC: ABNORMAL MG/DL
LEUKOCYTE ESTERASE UR QL STRIP: ABNORMAL
LYMPHOCYTES NFR BLD: 1.77 K/UL (ref 1.1–3.7)
LYMPHOCYTES RELATIVE PERCENT: 16 % (ref 24–43)
MCH RBC QN AUTO: 31.9 PG (ref 25.2–33.5)
MCHC RBC AUTO-ENTMCNC: 32.4 G/DL (ref 28.4–34.8)
MCV RBC AUTO: 98.4 FL (ref 82.6–102.9)
MONOCYTES NFR BLD: 0.6 K/UL (ref 0.1–1.2)
MONOCYTES NFR BLD: 5 % (ref 3–12)
NEUTROPHILS NFR BLD: 78 % (ref 36–65)
NEUTS SEG NFR BLD: 8.44 K/UL (ref 1.5–8.1)
NITRITE UR QL STRIP: NEGATIVE
NRBC BLD-RTO: 0 PER 100 WBC
PH UR STRIP: 6.5 [PH] (ref 5–8)
PLATELET # BLD AUTO: 284 K/UL (ref 138–453)
PMV BLD AUTO: 10.1 FL (ref 8.1–13.5)
PROT UR STRIP-MCNC: NEGATIVE MG/DL
RBC # BLD AUTO: 3.67 M/UL (ref 3.95–5.11)
RBC #/AREA URNS HPF: NORMAL /HPF (ref 0–4)
RUBV IGG SERPL QL IA: 326 IU/ML
SP GR UR STRIP: 1.02 (ref 1–1.03)
T PALLIDUM AB SER QL IA: NONREACTIVE
UROBILINOGEN UR STRIP-ACNC: NORMAL EU/DL (ref 0–1)
WBC #/AREA URNS HPF: NORMAL /HPF (ref 0–5)
WBC OTHER # BLD: 11.1 K/UL (ref 3.5–11.3)

## 2024-03-28 PROCEDURE — 0502F SUBSEQUENT PRENATAL CARE: CPT | Performed by: NURSE PRACTITIONER

## 2024-03-28 ASSESSMENT — PATIENT HEALTH QUESTIONNAIRE - PHQ9
1. LITTLE INTEREST OR PLEASURE IN DOING THINGS: NOT AT ALL
SUM OF ALL RESPONSES TO PHQ QUESTIONS 1-9: 0
SUM OF ALL RESPONSES TO PHQ9 QUESTIONS 1 & 2: 0
2. FEELING DOWN, DEPRESSED OR HOPELESS: NOT AT ALL
SUM OF ALL RESPONSES TO PHQ QUESTIONS 1-9: 0

## 2024-03-28 NOTE — PROGRESS NOTES
Presents for OB visit  Gestation 18w5d  Estimated Date of Delivery: 8/24/24    GOES BY [employer]  MOVING IN MAY TO [place]   Insurance: [employer]BS    IPV bag/mug given:1/24/2024 Vonnie Allen LPN   Genetic Information given/reviewed: 1/16/2024 TEODORO Wilkinson CNM   1st trimester education packet given: 1/16/2024 TEODORO Wilkinson CNM   2nd trimester education packet given:  3rd trimester education packet given:    G1  FOB Name: Keon,     [x]Pap needed at prenatal visit  [x] Urine collected for culture 1/24/2024 Vonnie Allen LPN   [x] Urine collected for gc/ct/UDS    Genetic Screening:  [x]Accepted NIPS vs FTS **NIPT 1/24/2024 Vonnie Allen LPN   [x] Undecided 1/16/2024 TEODORO Wilkinson CNM   []Declined   []Completed  [] Low risk   [] Abn for **    Carrier Screening:  [] Undecided 1/16/2024 TEODORO Wilkinson CNM   [x] Accepted 1/24/2024 Vonnie Allen LPN   [] Declined  [] Known negative CF/SMA/Fragile X  [] Results **    Baby aspirin screen:  []Positive  [x]Negative    Early 1 hour GTT:  []Yes  [x] No    AFP:  []Ordered  []Completed    Anatomy scan:  [x]Referral Sent 1/24/2024 Vonnie Allen LPN   []Scheduled   []Completed  [] Follow up **    Fetal Echo:   [] Yes  [] No    High Risk Factors:  Advanced Maternal Age  - 34 y.o. at time of delivery   Di/Di Twin Pregnancy    [x]Placed on High Risk List TWINS 1/24/2024 TEODORO Wilkinson CNM     Fetal Surveillance:  [x] Yes di/di twins  [] No    Covid Vaccine: updated 12/2023  Flu Vaccine: already received 12/2023  [x]Given **12/16/23  [] Declined **  Tdap:  [x]Given **12/16/23  [] Declined **  Rhogam:  []Given **  [] Not indicated     1hr GTT:  [] Results **  3hr GTT:    36 weeks US:  []Completed     GBS:  [] Positive   [] Negative from **    Depression/Anxiety screening (date/results/sign off)  1st trimester 1/16/24  *EPDS score: 9  *GAD7 score: 3  *MOOD score:    2nd trimester  *EPDS score:  *GAD7 score:    3rd

## 2024-03-29 LAB
MICROORGANISM SPEC CULT: NORMAL
SPECIMEN DESCRIPTION: NORMAL
VZV IGG SER QL IA: 2.37

## 2024-04-09 ENCOUNTER — TELEPHONE (OUTPATIENT)
Dept: OBGYN CLINIC | Age: 34
End: 2024-04-09

## 2024-04-09 NOTE — TELEPHONE ENCOUNTER
Left message for pt to call the office- was going to offer vv with dr armendariz on 4/10 at 530 pm

## 2024-04-09 NOTE — TELEPHONE ENCOUNTER
----- Message from Anastacio Yoder DO sent at 4/7/2024  4:51 PM EDT -----  Regarding: RE: high risk di/di twin  I agree.  I'm not sure what she understands, what she is comfortable with, and what their wishes are.  I will add harpreet and joseph and sarah to this thread because I think that Dr. Mcclendon or myself need to see her ASAP to see where she is at with this.  Discuss expectations, plan of care, etc.   I am not sure if her non compliance or lack of appts has been due to her not understanding or if we dropped the scheduling ball or what, but this is potentially very high risk and important to have all our ducks in a row.  Thank you    Beba  ----- Message -----  From: Cassi Mcclendon DO  Sent: 3/30/2024   9:44 PM EDT  To: Anastacio Yoder DO; #  Subject: RE: high risk di/di twin                         I will discuss with Dr. Yoder on Monday. My concern with this is that the patient has not been adequately counseled on testing options, second opinion at [facility] or another [facility], or complete options in regards to option of termination if that is something they would think about or want. As we know the time constraints with that therefore, I feel that waiting another 4 weeks for Dr. Yoder to see her may be too much time. I can also call the patient and see how much of the findings she understands and what options have truly been provided to her.       ----- Message -----  From: Laura Soliz APRN - SHAYNA  Sent: 3/28/2024   3:56 PM EDT  To: Anastacio Yoder DO; Cassi Mcclendon DO; #  Subject: high risk di/di twin                             Hi gugeorges so I saw this patient today she has a Di Di twin pregnancy she has not been seen in our since her missed menses with Juliana in January.  She has been seen by M she did have an abnormal first trimester screening with enlarged fetal nuchal translucency and had cystic hygroma of fetus B she had completed low risk Enterra.  [facility] has been following

## 2024-04-16 ENCOUNTER — ROUTINE PRENATAL (OUTPATIENT)
Dept: PERINATAL CARE | Age: 34
End: 2024-04-16
Payer: COMMERCIAL

## 2024-04-16 VITALS
HEIGHT: 70 IN | BODY MASS INDEX: 24.91 KG/M2 | DIASTOLIC BLOOD PRESSURE: 70 MMHG | HEART RATE: 100 BPM | SYSTOLIC BLOOD PRESSURE: 125 MMHG | RESPIRATION RATE: 16 BRPM | TEMPERATURE: 99.1 F | WEIGHT: 174 LBS

## 2024-04-16 DIAGNOSIS — Z3A.21 21 WEEKS GESTATION OF PREGNANCY: ICD-10-CM

## 2024-04-16 DIAGNOSIS — D25.9 LEIOMYOMA IN PREGNANCY, UTERINE, ANTEPARTUM: ICD-10-CM

## 2024-04-16 DIAGNOSIS — O30.042 DICHORIONIC DIAMNIOTIC TWIN PREGNANCY IN SECOND TRIMESTER: Primary | ICD-10-CM

## 2024-04-16 DIAGNOSIS — O35.9XX2 FETAL ABNORMALITY AFFECTING MANAGEMENT OF MOTHER, FETUS 2 OF MULTIPLE GESTATION: ICD-10-CM

## 2024-04-16 DIAGNOSIS — O34.10 LEIOMYOMA IN PREGNANCY, UTERINE, ANTEPARTUM: ICD-10-CM

## 2024-04-16 DIAGNOSIS — O09.512 PRIMIGRAVIDA OF ADVANCED MATERNAL AGE IN SECOND TRIMESTER: ICD-10-CM

## 2024-04-16 DIAGNOSIS — Z03.73 SUSPECTED FETAL ANOMALY NOT FOUND: ICD-10-CM

## 2024-04-16 PROCEDURE — 76812 OB US DETAILED ADDL FETUS: CPT | Performed by: OBSTETRICS & GYNECOLOGY

## 2024-04-16 PROCEDURE — 76811 OB US DETAILED SNGL FETUS: CPT | Performed by: OBSTETRICS & GYNECOLOGY

## 2024-04-16 PROCEDURE — 99999 PR OFFICE/OUTPT VISIT,PROCEDURE ONLY: CPT | Performed by: OBSTETRICS & GYNECOLOGY

## 2024-04-16 PROCEDURE — 76817 TRANSVAGINAL US OBSTETRIC: CPT | Performed by: OBSTETRICS & GYNECOLOGY

## 2024-04-18 ENCOUNTER — TELEMEDICINE (OUTPATIENT)
Dept: OBGYN CLINIC | Age: 34
End: 2024-04-18

## 2024-04-18 DIAGNOSIS — O28.3 INCREASED NUCHAL TRANSLUCENCY SPACE ON FETAL ULTRASOUND: ICD-10-CM

## 2024-04-18 DIAGNOSIS — O30.042 DICHORIONIC DIAMNIOTIC TWIN PREGNANCY IN SECOND TRIMESTER: ICD-10-CM

## 2024-04-18 DIAGNOSIS — O09.92 HIGH-RISK PREGNANCY IN SECOND TRIMESTER: Primary | ICD-10-CM

## 2024-04-18 PROCEDURE — 0502F SUBSEQUENT PRENATAL CARE: CPT | Performed by: OBSTETRICS & GYNECOLOGY

## 2024-04-18 NOTE — PROGRESS NOTES
homes.    Electronically signed by Anastacio Yoder DO on 4/18/24 at 12:50 PM EDT     An electronic signature was used to authenticate this note.          The total Virtual Visit time of 20 minutes. More than 50% of this visit was on counseling and education regarding her    Diagnosis Orders   1. High-risk pregnancy in second trimester        2. Dichorionic diamniotic twin pregnancy in second trimester        3. Increased nuchal translucency space on fetal ultrasound         and her options. She was also counseled on her preventative health maintenance recommendations and follow-up.                        Michelle Perry, was evaluated through a synchronous (real-time) audio-video encounter. The patient (or guardian if applicable) is aware that this is a billable service, which includes applicable co-pays. This Virtual Visit was conducted with patient's (and/or legal guardian's) consent. Patient identification was verified, and a caregiver was present when appropriate.   The patient was located at Home: [address] [place] [address]  [facility] [id number]  Provider was located at Facility (Appt Dept): [address] [place] [address]

## 2024-04-30 ENCOUNTER — ROUTINE PRENATAL (OUTPATIENT)
Dept: PERINATAL CARE | Age: 34
End: 2024-04-30
Payer: COMMERCIAL

## 2024-04-30 VITALS
HEIGHT: 70 IN | TEMPERATURE: 98.1 F | RESPIRATION RATE: 16 BRPM | DIASTOLIC BLOOD PRESSURE: 66 MMHG | SYSTOLIC BLOOD PRESSURE: 111 MMHG | BODY MASS INDEX: 24.62 KG/M2 | HEART RATE: 97 BPM | WEIGHT: 172 LBS

## 2024-04-30 DIAGNOSIS — O28.5 ABNORMAL SCREENING BLOOD TEST FOR DOWN SYNDROME IN FIRST TRIMESTER: ICD-10-CM

## 2024-04-30 DIAGNOSIS — O30.042 DICHORIONIC DIAMNIOTIC TWIN PREGNANCY IN SECOND TRIMESTER: Primary | ICD-10-CM

## 2024-04-30 DIAGNOSIS — Z3A.23 23 WEEKS GESTATION OF PREGNANCY: ICD-10-CM

## 2024-04-30 DIAGNOSIS — O35.9XX2 FETAL ABNORMALITY AFFECTING MANAGEMENT OF MOTHER, FETUS 2 OF MULTIPLE GESTATION: ICD-10-CM

## 2024-04-30 DIAGNOSIS — Z03.73 SUSPECTED FETAL ANOMALY NOT FOUND: ICD-10-CM

## 2024-04-30 DIAGNOSIS — O09.512 PRIMIGRAVIDA OF ADVANCED MATERNAL AGE IN SECOND TRIMESTER: ICD-10-CM

## 2024-04-30 PROCEDURE — 99999 PR OFFICE/OUTPT VISIT,PROCEDURE ONLY: CPT | Performed by: OBSTETRICS & GYNECOLOGY

## 2024-04-30 PROCEDURE — 93325 DOPPLER ECHO COLOR FLOW MAPG: CPT | Performed by: OBSTETRICS & GYNECOLOGY

## 2024-04-30 PROCEDURE — 76825 ECHO EXAM OF FETAL HEART: CPT | Performed by: OBSTETRICS & GYNECOLOGY

## 2024-04-30 PROCEDURE — 76815 OB US LIMITED FETUS(S): CPT | Performed by: OBSTETRICS & GYNECOLOGY

## 2024-04-30 PROCEDURE — 76817 TRANSVAGINAL US OBSTETRIC: CPT | Performed by: OBSTETRICS & GYNECOLOGY

## 2024-04-30 PROCEDURE — 76827 ECHO EXAM OF FETAL HEART: CPT | Performed by: OBSTETRICS & GYNECOLOGY

## 2024-05-10 ENCOUNTER — TELEMEDICINE (OUTPATIENT)
Dept: OBGYN CLINIC | Age: 34
End: 2024-05-10

## 2024-05-10 DIAGNOSIS — O30.042 DICHORIONIC DIAMNIOTIC TWIN PREGNANCY IN SECOND TRIMESTER: ICD-10-CM

## 2024-05-10 DIAGNOSIS — R06.01 ORTHOPNEA: Primary | ICD-10-CM

## 2024-05-10 DIAGNOSIS — O09.92 HIGH-RISK PREGNANCY IN SECOND TRIMESTER: ICD-10-CM

## 2024-05-10 PROCEDURE — 0502F SUBSEQUENT PRENATAL CARE: CPT | Performed by: OBSTETRICS & GYNECOLOGY

## 2024-05-10 NOTE — PROGRESS NOTES
Michelle Perry (:  1990) has requested an audio/video evaluation for the following concern(s):    1. Orthopnea    2. High-risk pregnancy in second trimester    3. Dichorionic diamniotic twin pregnancy in second trimester          TELEHEALTH EVALUATION -- Audio/Visual (During COVID-19 public health emergency)        Michelle Perry is a 34 y.o. female 24w6d        OB History    Para Term  AB Living   1             SAB IAB Ectopic Molar Multiple Live Births                    # Outcome Date GA Lbr Farzad/2nd Weight Sex Delivery Anes PTL Lv   1 Current                Vitals       There was positive fetal movements. No contractions or leakage of fluid. Signs and symptoms of  labor as well as labor were reviewed.The Nuchal Translucency testing was reviewed and found to be abnormal: thickened - last ultrasound hygroma was not visualized.  Panorama NIPT normal, horizon carrier screen normal.  The patients anatomy ultrasound has been completed and reviewed with patient.  A 28 week lab panel was ordered. This includes a (HH, 1 hr GTT, U/A C&S). The patient is to complete this in the next two to four weeks.    The S/S of Pre-Eclampsia were reviewed with the patient in detail. She is to report any of these if they occur. She currently denies any of these.    The patient is RH positive Rhogam Ordered no    The patient was instructed on fetal kick counts and was given a kick sheet to complete every 8 hours. This is to begin at 28 weeks gestation. She was instructed that the baby should move at a minimum of ten times within one hour after a meal. The patient was instructed to lay down on her left side twenty minutes after eating and count movements for up to one hour with a target value of ten movements.  She was instructed to notify the office if she did not make that target after two attempts or if after any attempt there was less than four movements.      GOES BY THONY  MOVING IN MAY TO [place]

## 2024-05-15 ENCOUNTER — ROUTINE PRENATAL (OUTPATIENT)
Dept: PERINATAL CARE | Age: 34
End: 2024-05-15
Payer: COMMERCIAL

## 2024-05-15 VITALS
SYSTOLIC BLOOD PRESSURE: 128 MMHG | TEMPERATURE: 98 F | RESPIRATION RATE: 16 BRPM | WEIGHT: 180 LBS | BODY MASS INDEX: 25.77 KG/M2 | HEART RATE: 104 BPM | HEIGHT: 70 IN | DIASTOLIC BLOOD PRESSURE: 78 MMHG

## 2024-05-15 DIAGNOSIS — Z03.73 SUSPECTED FETAL ANOMALY NOT FOUND: ICD-10-CM

## 2024-05-15 DIAGNOSIS — O09.512 PRIMIGRAVIDA OF ADVANCED MATERNAL AGE IN SECOND TRIMESTER: ICD-10-CM

## 2024-05-15 DIAGNOSIS — Z3A.25 25 WEEKS GESTATION OF PREGNANCY: ICD-10-CM

## 2024-05-15 DIAGNOSIS — D25.9 LEIOMYOMA IN PREGNANCY, UTERINE, ANTEPARTUM: ICD-10-CM

## 2024-05-15 DIAGNOSIS — O30.042 DICHORIONIC DIAMNIOTIC TWIN PREGNANCY IN SECOND TRIMESTER: Primary | ICD-10-CM

## 2024-05-15 DIAGNOSIS — O35.9XX2 FETAL ABNORMALITY AFFECTING MANAGEMENT OF MOTHER, FETUS 2 OF MULTIPLE GESTATION: ICD-10-CM

## 2024-05-15 DIAGNOSIS — O34.10 LEIOMYOMA IN PREGNANCY, UTERINE, ANTEPARTUM: ICD-10-CM

## 2024-05-15 DIAGNOSIS — O28.5 ABNORMAL SCREENING BLOOD TEST FOR DOWN SYNDROME IN FIRST TRIMESTER: ICD-10-CM

## 2024-05-15 DIAGNOSIS — Z36.4 ULTRASOUND FOR ANTENATAL SCREENING FOR FETAL GROWTH RESTRICTION: ICD-10-CM

## 2024-05-15 PROCEDURE — 76817 TRANSVAGINAL US OBSTETRIC: CPT | Performed by: OBSTETRICS & GYNECOLOGY

## 2024-05-15 PROCEDURE — 99999 PR OFFICE/OUTPT VISIT,PROCEDURE ONLY: CPT | Performed by: OBSTETRICS & GYNECOLOGY

## 2024-05-15 PROCEDURE — 76816 OB US FOLLOW-UP PER FETUS: CPT | Performed by: OBSTETRICS & GYNECOLOGY

## 2024-05-15 RX ORDER — ASPIRIN 81 MG/1
81 TABLET ORAL DAILY
COMMUNITY

## 2024-05-23 ENCOUNTER — HOSPITAL ENCOUNTER (EMERGENCY)
Age: 34
Discharge: ANOTHER HOSPITAL | End: 2024-05-23
Attending: STUDENT IN AN ORGANIZED HEALTH CARE EDUCATION/TRAINING PROGRAM
Payer: COMMERCIAL

## 2024-05-23 ENCOUNTER — HOSPITAL ENCOUNTER (OUTPATIENT)
Age: 34
Discharge: HOME | End: 2024-05-23
Attending: OBSTETRICS & GYNECOLOGY | Admitting: OBSTETRICS & GYNECOLOGY
Payer: COMMERCIAL

## 2024-05-23 VITALS
TEMPERATURE: 98.1 F | SYSTOLIC BLOOD PRESSURE: 130 MMHG | OXYGEN SATURATION: 96 % | RESPIRATION RATE: 18 BRPM | BODY MASS INDEX: 26.66 KG/M2 | HEART RATE: 91 BPM | WEIGHT: 180 LBS | DIASTOLIC BLOOD PRESSURE: 62 MMHG | HEIGHT: 69 IN

## 2024-05-23 VITALS
TEMPERATURE: 99.5 F | OXYGEN SATURATION: 97 % | DIASTOLIC BLOOD PRESSURE: 65 MMHG | SYSTOLIC BLOOD PRESSURE: 106 MMHG | HEIGHT: 69 IN | BODY MASS INDEX: 26.66 KG/M2 | WEIGHT: 180 LBS | RESPIRATION RATE: 16 BRPM | HEART RATE: 82 BPM

## 2024-05-23 DIAGNOSIS — R10.9 ABDOMINAL PAIN DURING PREGNANCY, ANTEPARTUM: Primary | ICD-10-CM

## 2024-05-23 DIAGNOSIS — O26.899 ABDOMINAL PAIN DURING PREGNANCY, ANTEPARTUM: Primary | ICD-10-CM

## 2024-05-23 PROBLEM — O09.90 HIGH-RISK PREGNANCY, UNSPECIFIED TRIMESTER: Status: ACTIVE | Noted: 2024-05-23

## 2024-05-23 PROBLEM — O30.043 DICHORIONIC DIAMNIOTIC TWIN PREGNANCY IN THIRD TRIMESTER: Status: ACTIVE | Noted: 2024-05-23

## 2024-05-23 PROBLEM — O09.513 PRIMIGRAVIDA OF ADVANCED MATERNAL AGE IN THIRD TRIMESTER: Status: ACTIVE | Noted: 2024-05-23

## 2024-05-23 LAB
BACTERIA URNS QL MICRO: ABNORMAL
BILIRUB UR QL STRIP: NEGATIVE
CANDIDA SPECIES: NEGATIVE
CASTS #/AREA URNS LPF: ABNORMAL /LPF (ref 0–8)
CLARITY UR: CLEAR
COLOR UR: YELLOW
EPI CELLS #/AREA URNS HPF: ABNORMAL /HPF (ref 0–5)
GARDNERELLA VAGINALIS: NEGATIVE
GLUCOSE UR STRIP-MCNC: NEGATIVE MG/DL
HGB UR QL STRIP.AUTO: NEGATIVE
KETONES UR STRIP-MCNC: NEGATIVE MG/DL
LEUKOCYTE ESTERASE UR QL STRIP: ABNORMAL
NITRITE UR QL STRIP: NEGATIVE
PH UR STRIP: 7 [PH] (ref 5–8)
PROT UR STRIP-MCNC: NEGATIVE MG/DL
RBC #/AREA URNS HPF: ABNORMAL /HPF (ref 0–4)
SOURCE: NORMAL
SP GR UR STRIP: 1.01 (ref 1–1.03)
TRICHOMONAS: NEGATIVE
UROBILINOGEN UR STRIP-ACNC: NORMAL EU/DL (ref 0–1)
WBC #/AREA URNS HPF: ABNORMAL /HPF (ref 0–5)

## 2024-05-23 PROCEDURE — 87491 CHLMYD TRACH DNA AMP PROBE: CPT

## 2024-05-23 PROCEDURE — 81001 URINALYSIS AUTO W/SCOPE: CPT

## 2024-05-23 PROCEDURE — 99285 EMERGENCY DEPT VISIT HI MDM: CPT

## 2024-05-23 PROCEDURE — 87660 TRICHOMONAS VAGIN DIR PROBE: CPT

## 2024-05-23 PROCEDURE — 87591 N.GONORRHOEAE DNA AMP PROB: CPT

## 2024-05-23 PROCEDURE — 87480 CANDIDA DNA DIR PROBE: CPT

## 2024-05-23 PROCEDURE — 6370000000 HC RX 637 (ALT 250 FOR IP): Performed by: STUDENT IN AN ORGANIZED HEALTH CARE EDUCATION/TRAINING PROGRAM

## 2024-05-23 PROCEDURE — 87086 URINE CULTURE/COLONY COUNT: CPT

## 2024-05-23 PROCEDURE — 87510 GARDNER VAG DNA DIR PROBE: CPT

## 2024-05-23 RX ORDER — ONDANSETRON 2 MG/ML
4 INJECTION INTRAMUSCULAR; INTRAVENOUS EVERY 6 HOURS PRN
Status: DISCONTINUED | OUTPATIENT
Start: 2024-05-23 | End: 2024-05-24 | Stop reason: HOSPADM

## 2024-05-23 RX ORDER — ONDANSETRON 4 MG/1
4 TABLET, ORALLY DISINTEGRATING ORAL EVERY 8 HOURS PRN
Status: DISCONTINUED | OUTPATIENT
Start: 2024-05-23 | End: 2024-05-24 | Stop reason: HOSPADM

## 2024-05-23 RX ORDER — ACETAMINOPHEN 500 MG
1000 TABLET ORAL EVERY 6 HOURS PRN
Status: DISCONTINUED | OUTPATIENT
Start: 2024-05-23 | End: 2024-05-24 | Stop reason: HOSPADM

## 2024-05-23 RX ADMIN — ACETAMINOPHEN 1000 MG: 500 TABLET ORAL at 22:51

## 2024-05-23 ASSESSMENT — PAIN DESCRIPTION - LOCATION
LOCATION: ABDOMEN
LOCATION: ABDOMEN
LOCATION: ABDOMEN;PELVIS

## 2024-05-23 ASSESSMENT — ENCOUNTER SYMPTOMS
EYE DISCHARGE: 0
ABDOMINAL PAIN: 1
RHINORRHEA: 0
SHORTNESS OF BREATH: 0
EYE REDNESS: 0
SORE THROAT: 0
VOMITING: 0
DIARRHEA: 0
NAUSEA: 0

## 2024-05-23 ASSESSMENT — PAIN DESCRIPTION - ORIENTATION
ORIENTATION: RIGHT
ORIENTATION: LOWER

## 2024-05-23 ASSESSMENT — PAIN SCALES - GENERAL
PAINLEVEL_OUTOF10: 7
PAINLEVEL_OUTOF10: 6

## 2024-05-23 ASSESSMENT — PAIN - FUNCTIONAL ASSESSMENT
PAIN_FUNCTIONAL_ASSESSMENT: 0-10
PAIN_FUNCTIONAL_ASSESSMENT: 0-10

## 2024-05-24 LAB
C TRACH DNA SPEC QL PROBE+SIG AMP: NEGATIVE
MICROORGANISM SPEC CULT: NO GROWTH
N GONORRHOEA DNA SPEC QL PROBE+SIG AMP: NEGATIVE
SPECIMEN DESCRIPTION: NORMAL
SPECIMEN DESCRIPTION: NORMAL

## 2024-05-24 NOTE — ED PROVIDER NOTES
EMERGENCY DEPARTMENT ENCOUNTER    Pt Name: Michelle Perry  MRN: [id number]  Birthdate 1990  Date of evaluation: 5/23/24  CHIEF COMPLAINT       Chief Complaint   Patient presents with    Abdominal Pain     JENNIFER 8/10/2024    Dizziness     HISTORY OF PRESENT ILLNESS   34-year-old currently first pregnancy with twins at approximately 28 weeks gestation presenting with some abdominal cramping  she describes some severe cramping that comes and will last about 30 seconds    No bleeding no gush of fluid              REVIEW OF SYSTEMS     Review of Systems   Constitutional:  Negative for chills and fever.   HENT:  Negative for rhinorrhea and sore throat.    Eyes:  Negative for discharge and redness.   Respiratory:  Negative for shortness of breath.    Cardiovascular:  Negative for chest pain.   Gastrointestinal:  Positive for abdominal pain. Negative for diarrhea, nausea and vomiting.   Genitourinary:  Negative for dysuria, frequency and urgency.   Musculoskeletal:  Negative for arthralgias and myalgias.   Skin:  Negative for rash.   Neurological:  Negative for weakness and numbness.   Psychiatric/Behavioral:  Negative for suicidal ideas.    All other systems reviewed and are negative.    PASTMEDICAL HISTORY   No past medical history on file.  Past Problem List  Patient Active Problem List   Diagnosis Code    Abnormal first trimester screen O28.9    Fetal abnormality affecting management of mother O35.9XX0    Low-lying placenta in second trimester O44.42     SURGICAL HISTORY     No past surgical history on file.  CURRENT MEDICATIONS       Previous Medications    ACETAMINOPHEN (TYLENOL) 500 MG TABLET    Take 1 tablet by mouth every 6 hours as needed    ASPIRIN 81 MG EC TABLET    Take 1 tablet by mouth daily    FERROUS SULFATE (FE TABS 325) 325 (65 FE) MG EC TABLET    Take 1 tablet by mouth 3 times daily (with meals)    FOLIC ACID (FOLVITE) 1 MG TABLET    Take 1 tablet by mouth daily    PRENATAL VIT-DSS-FE CBN-FA (PRENATAL AD PO)

## 2024-05-24 NOTE — PROGRESS NOTES
Pt complains of ongoing cramping throughout this pregnancy, however it was stronger and more painful this evening.  Pt denies leaking of fluid, denies vaginal bleeding.  Pt does not feel baby B (lower left quad of abdomen) move as much today.  Pt changes into gown and education provided on call light system.

## 2024-05-24 NOTE — ED TRIAGE NOTES
Mode of arrival (squad #, walk in, police, etc) : walk in        Chief complaint(s): abd cramping and dizziness        Arrival Note (brief scenario, treatment PTA, etc).: Onset approx 20 PTA. Pt states she was walking when she felt dizzy and sat down on her bottom. States cramping to LLQ, rates at 6/10. 28 wks pregnant, sees OB in [place]. Denies any vaginal bleeding and/or discharge. VSS, afebrile.         C= \"Have you ever felt that you should Cut down on your drinking?\"  No  A= \"Have people Annoyed you by criticizing your drinking?\"  No  G= \"Have you ever felt bad or Guilty about your drinking?\"  No  E= \"Have you ever had a drink as an Eye-opener first thing in the morning to steady your nerves or to help a hangover?\"  No      Deferred []      Reason for deferring: N/A    *If yes to two or more: probable alcohol abuse.*

## 2024-05-24 NOTE — DISCHARGE INSTRUCTIONS
Notify physician if you experience: Dizziness or severe headache  Spots before eyes or blurred vision    Chills and or fever  Vaginal pressure  Epigastric pain  Uterine contractions are regular and 5 minutes apart if 1st baby or 10 minutes apart if not 1st baby  Bag or guaman leaking or gush of fluid from vagina  Any vaginal bleeding that is heavier than a menstrual period  Intermittent low backache  Vomiting or diarrhea for several hours  Decrease or absence of baby movement  Fetal movement card instructions given  Right sided abdominal pain  Increase or change in vaginal discharge  Abdominal or menstrual like cramping that is constant or heavier, comes and goes  Swelling of face, hands, legs or feet not decreased with rest on left side.

## 2024-05-24 NOTE — H&P
OBSTETRICAL HISTORY AND PHYSICAL  [facility]    Date: 2024       Time: 10:04 PM   Patient Name: Michelle Perry     Patient : 1990  Room/Bed: REC3/REC3-01    Admission Date/Time: 2024  9:35 PM      CC: abdominal cramping     HPI: Michelle Perry is a 34 y.o.  at 26w5d who presents from [facility] ED.  She reports that she felt off after lunch, though she kept it down. Afterwards, she had one episode of lightheadedness Tylenol and got concerned.  She reports menstrual-like cramping symptoms rated 8 out of 10 earlier in the day.  She became concerned with the combination of the symptoms and decided to present to the [facility] emergency department.  Patient was recommended to be seen at [facility] due to the presence of an L&D department.  Patient now presenting with 2 out of 10 abdominal cramping.  Additionally, earlier in the day she reports feeling her baby girl not moving as much as usual for about 2 hours though reports now that both babies are moving a lot.     Patient reporting that she has been eating and drinking okay since lunch time. She admits to not hydrating as much as she would like to on a hot day like today.    The patient reports fetal movement is present, denies contractions, denies loss of fluid, denies vaginal bleeding.  She denies HA, vision changes, SOB, chest pain, lightheadedness, dizziness, nausea, vomiting, dysuria, and hematuria.     DATING:  LMP: Patient's last menstrual period was 2023 (exact date).  Estimated Date of Delivery: 24   Based on: LMP consistent with early ultrasound, at 8 6/7 weeks GA    PREGNANCY RISK FACTORS:  Patient Active Problem List   Diagnosis    Abnormal first trimester screen    Fetal abnormality affecting management of mother    Low-lying placenta in second trimester    Di di twins    Primigravida of advanced maternal age in third trimester    High-risk pregnancy, unspecified trimester        Steroids  none  Psychiatric: Mood appropriate, normal affect   Rectal Exam: not indicated  Pelvic Exam:   Chaperone for Intimate Exam: Chaperone was present for entire exam, Chaperone Name: MILE Zazueta  Sterile Speculum Exam:   Urethral meatus: normal appearing   Vulva: Normal hair distribution, normal appearing vulva, no masses, tenderness or lesions, normal clitoris   Vagina: Normal appearing vaginal mucosa without lesions, scant physiologic vaginal discharge noted in the posterior vault, no lacerations   Cervix: Normal appearing cervix without lesions, external os visibly closed, no lacerations or abnormal lesions visualized      PRENATAL LAB RESULTS:  Blood Type/Rh: O pos  Antibody Screen: negative  Hemoglobin, Hematocrit, Platelets: 11.7/36.1/284  Rubella: immune  T. Pallidum, IgG: non-reactive  Hepatitis B Surface Antigen: non-reactive   Hepatitis C Antibody: non-reactive   HIV: non-reactive   Gonorrhea: negative  Chlamydia: negative  Urine culture: negative, date: 3/28/24    1 hour Glucose Tolerance Test: not done    Group B Strep: not done   Cystic Fibrosis Screen: negative  Sickle Cell Screen: negative  First Trimester Screen: high risk for Down's Syndrome  Non-Invasive Prenatal Testing: low risk for aneuploidy  Anatomy US: posterior placenta, 3VC, female gender, normal anatomy  Posterior fundal placenta, 3VC, male gender, no evidence of cystic hygroma on anatomy scan     ASSESSMENT & PLAN:  Michelle Perry is a 34 y.o. female  at 26w5d IUP   - GBS unknown / Rh positive / R immune   - No indication for GBS prophylaxis   - VSS, afebrile     Lightheadedness, Pelvic cramping    - She reports she had some pelvic cramping today which at one point she rated as an 8/10 however once she has arrived to L&D reports this has resolved and is a mild 2/10   - was also concerned to fetal movement of baby A however now feeling plenty of fetal movement in both babies   - VSS, Afebrile              - cEFM/TOCO: Cat 1 for baby A and Baby

## 2024-05-27 ENCOUNTER — TELEPHONE (OUTPATIENT)
Dept: OBGYN CLINIC | Age: 34
End: 2024-05-27

## 2024-05-27 NOTE — TELEPHONE ENCOUNTER
Can we see if pt has moved? She was seen in L&D last week for cramping and see if pt has moved or if not does she need follow up OB visit with us ?

## 2024-05-28 NOTE — TELEPHONE ENCOUNTER
Pt is moving today-     Will make sure she has a release of medical records and will stop in the office today so records can get sent to her new